# Patient Record
Sex: FEMALE | Race: WHITE | NOT HISPANIC OR LATINO
[De-identification: names, ages, dates, MRNs, and addresses within clinical notes are randomized per-mention and may not be internally consistent; named-entity substitution may affect disease eponyms.]

---

## 2017-09-27 ENCOUNTER — RESULT REVIEW (OUTPATIENT)
Age: 37
End: 2017-09-27

## 2020-09-29 ENCOUNTER — RESULT REVIEW (OUTPATIENT)
Age: 40
End: 2020-09-29

## 2022-08-11 PROBLEM — Z00.00 ENCOUNTER FOR PREVENTIVE HEALTH EXAMINATION: Status: ACTIVE | Noted: 2022-08-11

## 2022-08-16 ENCOUNTER — NON-APPOINTMENT (OUTPATIENT)
Age: 42
End: 2022-08-16

## 2022-08-16 ENCOUNTER — APPOINTMENT (OUTPATIENT)
Dept: PULMONOLOGY | Facility: CLINIC | Age: 42
End: 2022-08-16

## 2022-08-16 VITALS
BODY MASS INDEX: 30.45 KG/M2 | OXYGEN SATURATION: 99 % | WEIGHT: 194 LBS | HEIGHT: 67 IN | SYSTOLIC BLOOD PRESSURE: 115 MMHG | HEART RATE: 70 BPM | DIASTOLIC BLOOD PRESSURE: 75 MMHG

## 2022-08-16 DIAGNOSIS — D75.839 THROMBOCYTOSIS, UNSPECIFIED: ICD-10-CM

## 2022-08-16 PROCEDURE — 94727 GAS DIL/WSHOT DETER LNG VOL: CPT

## 2022-08-16 PROCEDURE — 99203 OFFICE O/P NEW LOW 30 MIN: CPT | Mod: 25

## 2022-08-16 PROCEDURE — 94060 EVALUATION OF WHEEZING: CPT

## 2022-08-16 PROCEDURE — 94729 DIFFUSING CAPACITY: CPT

## 2022-08-16 NOTE — HISTORY OF PRESENT ILLNESS
[Never] : never [TextBox_4] : The patient is a 42-year-old lady with history of thrombocytosis and leukocytosis here for pulmonary evaluation.  She works as a schoolteacher.  She is a non-smoker.  There is no history of alcohol use.  She has no cough or dyspnea.  She does not exercise much.\par She is noted to have thrombocytosis and was followed by hematologist on and off.  Her PMD recently found out about it and referred her for pulmonary evaluation.\par The lab reports indicate that she had her last CBC done in August 21 when her white count was 12.66 and the platelet count was 429 which was not significantly different from platelet count done in 10/2020.\par Patiently currently has no symptoms referable to any particular organ system except for some backache.\par There is no family history of thrombocytosis.\par She is currently on levothyroxine.